# Patient Record
Sex: MALE | Race: WHITE | NOT HISPANIC OR LATINO | URBAN - METROPOLITAN AREA
[De-identification: names, ages, dates, MRNs, and addresses within clinical notes are randomized per-mention and may not be internally consistent; named-entity substitution may affect disease eponyms.]

---
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
## 2021-05-30 VITALS — WEIGHT: 126.5 LBS | BODY MASS INDEX: 18.74 KG/M2 | HEIGHT: 69 IN

## 2021-05-31 VITALS — WEIGHT: 140 LBS | HEIGHT: 70 IN | BODY MASS INDEX: 20.04 KG/M2

## 2021-06-01 VITALS — HEIGHT: 69 IN | WEIGHT: 134.25 LBS | BODY MASS INDEX: 19.88 KG/M2

## 2021-06-01 VITALS — HEIGHT: 69 IN | WEIGHT: 135.25 LBS | BODY MASS INDEX: 20.03 KG/M2

## 2021-06-01 VITALS — HEIGHT: 69 IN | WEIGHT: 135.56 LBS | BODY MASS INDEX: 20.08 KG/M2

## 2021-06-02 ENCOUNTER — RECORDS - HEALTHEAST (OUTPATIENT)
Dept: ADMINISTRATIVE | Facility: CLINIC | Age: 21
End: 2021-06-02

## 2021-06-02 VITALS — WEIGHT: 145.44 LBS | HEIGHT: 69 IN | BODY MASS INDEX: 21.54 KG/M2

## 2021-06-09 NOTE — PROGRESS NOTES
Name: Brandon Littlejohn  Age: 16 y.o.  Gender: male  : 2000  Date of Encounter: 3/21/2017    ASSESSMENT:  1. Streptococcal pharyngitis  - amoxicillin (AMOXIL) 500 MG tablet; Take 2 tablets (1,000 mg total) by mouth daily for 10 days.  Dispense: 20 tablet; Refill: 0      PLAN:  Complete antibiotics as ordered above.  Continue to use tylenol or ibuprofen as needed for pain and fever.  Your child is contagious for the next 24 hours and then can return to activities as tolerated.  If symptoms are not improving in the next 48 hours please call back.          CHIEF COMPLAINT:  Chief Complaint   Patient presents with     Fever     Sore Throat     started Friday     Headache       HPI:  Brandon Littlejohn is a 16 y.o.  male who presents to the clinic with mom with concerns for throat pain. Symptoms started 4 days ago. He has had a fever, Tmax 101, for the last two days. He has a headache that is worse with sitting and standing. He took ibuprofen most recently last night before bed to assist with sleep. He has pain with both eating and drinking. He has a mild, dry cough without nasal congestion. He denies nausea, stomach pain, and diarrhea.    Past Med / Surg History: He is under vaccinated. Allergic to benzonatate with laryngospasm. Otherwise healthy.     Fam / Soc History:  Social History     Social History     Marital status: Single     Spouse name: N/A     Number of children: N/A     Years of education: N/A     Social History Main Topics     Smoking status: Never Smoker     Smokeless tobacco: None     Alcohol use None     Drug use: None     Sexual activity: Not Asked     Other Topics Concern     None     Social History Narrative       ROS:  Gen: As reviewed above.  Eyes: No eye discharge.   ENT: As reviewed above.  Resp: As reviewed above.  GI: As reviewed above.  : Urinating well.  MS: No joint/bone/muscle tenderness.  Skin: No rashes.  Neuro: No headaches.      Objective:  Vitals:   Visit Vitals      "/62 (Patient Site: Right Arm, Patient Position: Sitting, Cuff Size: Adult Regular)     Pulse 105     Temp 100.3  F (37.9  C) (Oral)     Ht 5' 8.5\" (1.74 m)     Wt 126 lb 8 oz (57.4 kg)     SpO2 98%     BMI 18.95 kg/m2     Wt Readings from Last 3 Encounters:   03/21/17 126 lb 8 oz (57.4 kg) (38 %, Z= -0.31)*   12/16/16 127 lb 4 oz (57.7 kg) (44 %, Z= -0.16)*   06/17/16 122 lb 9.6 oz (55.6 kg) (44 %, Z= -0.14)*     * Growth percentiles are based on CDC 2-20 Years data.       Gen: Alert, well appearing.  Eyes: Conjunctivae clear bilaterally. PERRL. EOMI.   ENT: Left TM pearly gray with visible bony landmarks and light reflex. Right TM pearly gray with visible bony landmarks and light reflex. No nasal congestion. No presence of nasal drainage. Posterior oropharynx erythematous. Mucosa moist and intact.  Heart: Regular rate and rhythm; normal S1 and S2; no murmurs.  Lungs: Unlabored respirations. Clear breath sounds throughout with good air movement. No wheezes, crackles, or rhonchi.  Abdomen: Bowel sounds present. Abdomen is non-distended. Abdomen is soft and non-tender to palpation. No hepatosplenomegaly. No masses.   Skin: Normal without rash, lesions, or bruising.  Neuro: Appropriate for age.  Hematologic/Lymph/Immune:  Anterior cervical lymph nodes shotty.       Pertinent results / imaging:  Recent Results (from the past 24 hour(s))   Rapid Strep A Screen-Throat   Result Value Ref Range    Rapid Strep A Antigen Group A Strep detected (!) No Group A Strep detected       DATA REVIEWED:  Additional History from Old Records Summarized (2): None  Decision to Obtain Records (1): None  Radiology Tests Summarized or Ordered (1): None  Labs Reviewed or Ordered (1): Labs ordered.  Medicine Test Summarized or Ordered (1): None  Independent Review of EKG, X-RAY, or RAPID STREP (2 each): None    The visit lasted a total of 9 minutes face to face with the patient. Over 50% of the time was spent counseling and educating the " patient about fever and sore throat.    I, Jackie Gandhi, am scribing for and in the presence of, ABILIO Jiménez.    I, ABILIO Jiménez, personally performed the services described in this documentation, as scribed by Jackie Gandhi in my presence, and it is both accurate and complete.    ABILIO Jiménez  Certified Pediatric Nurse Practitioner  [facility]  [phone]    Total Data Points: 1

## 2021-06-12 NOTE — PROGRESS NOTES
"ASSESSMENT: Plantar fasciitis, bilateral arch pain    PLAN: I recommended custom orthotics.  He was given a written order for custom orthotics through [facility] in [place].  Return to clinic if pain persists.      SUBJECTIVE: New patient visit at the [facility] regarding bilateral foot pain around the heels and metatarsal heads.  No trauma.  He has not seen bruising or swelling.  Symptoms have been present for several months.  He has tried a few different shoes without much change.  Pain is worse with increased activities.  He denies post static pain.  No pain in the Achilles tendon.    PHYSICAL EXAM:  /70  Pulse 64  Resp 16  Ht 5' 10\" (1.778 m)  Wt 140 lb (63.5 kg)  BMI 20.09 kg/m2  General: Pleasant 17 y.o. male in no acute distress.  Vascular: DP pulses are palpable. PT pulses are palpable. Pedal hair is present. Feet are warm to the touch.  Cardiac: Pulse is regular.  Lymphatic: No edema at the ankles.  Neuro: Sensation in the feet is grossly intact to light touch.  Derm: No open lesions.  Musculoskeletal: Tight plantar fascia bilaterally.  Otherwise reasonable arch height in adequate motion in foot and ankle joints.    Past Medical History:   Diagnosis Date     Eczema         has no past surgical history on file.    Allergies   Allergen Reactions     Benzonatate Other (See Comments)     Laryngospasm         No current outpatient prescriptions on file.     No current facility-administered medications for this visit.        Family History:  family history includes ADELFO disease in his father and paternal grandfather; No Medical Problems in his brother, maternal aunt, maternal grandfather, maternal grandmother, maternal uncle, mother, paternal aunt, paternal grandmother, paternal uncle, and sister. There is no history of Cancer, Clotting disorder, Diabetes, Dislocations, Osteoporosis, Psoriasis, Rashes / Skin problems, Severe sprains, or Ulcerative colitis.    Social History:  Reviewed, and he " reports that he has never smoked. He does not have any smokeless tobacco history on file.    Review of Systems:  A 12 point comprehensive review of systems was negative except as noted.

## 2021-06-17 NOTE — PROGRESS NOTES
2 6 mm warts one on each foot, one on the left great toe and one on the right first met ahead which was a plantar wart, treated with cryotherapy without complication.

## 2021-06-18 NOTE — PROGRESS NOTES
"Chief Complaint   Patient presents with     Sore Throat     throat still does not feel good, coughing now and runny nose, canker sores       HPI: Patient presents with continued continued, mild sore throat and rhinorrhea.  This is in the context of taking Pen-Vee K without success.  No fatigue.  Interestingly, discussion with mother notes that he is had seasonal allergies in the past.  Old records reviewed from 1/26/2015 shows, in summary, that he had a long history of a viral illness and not feeling well in the past as well.  He was treated with Zithromax.    ROS: No fever.  No fatigue.  No vomiting or diarrhea.    SH:    reports that he has never smoked. He does not have any smokeless tobacco history on file.      FH: The Patient's family history includes ADELFO disease in his father and paternal grandfather; No Medical Problems in his brother, maternal aunt, maternal grandfather, maternal grandmother, maternal uncle, mother, paternal aunt, paternal grandmother, paternal uncle, and sister. There is no history of Cancer, Clotting disorder, Diabetes, Dislocations, Osteoporosis, Psoriasis, Rashes / Skin problems, Severe sprains, or Ulcerative colitis.     Meds:  Brandon has a current medication list which includes the following prescription(s): penicillin vk.    O:  /72  Pulse 99  Temp 98.5  F (36.9  C)  Resp 16  Ht 5' 9\" (1.753 m)  Wt 135 lb 9 oz (61.5 kg)  SpO2 98%  BMI 20.02 kg/m2  HEENT shows nares normal with no rhinorrhea  Pharynx erythematous with postnasal drainage but no tonsillar swelling  Neck supple with no lymphadenopathy    A/P:   1. Seasonal allergies  -Zyrtec 10 mg daily  -Benadryl as needed  -Continue Pen-Vee K  -Return if not improving                                          "

## 2021-06-18 NOTE — PROGRESS NOTES
"Chief Complaint   Patient presents with     Sore Throat       HPI: Very pleasant 18-year-old male presents today with sharp constant pain in his throat for 24 hours duration of mild intensity.  Of note: He has a past history of strep pharyngitis.    ROS: No fever vomiting or diarrhea    SH:    reports that he has never smoked. He does not have any smokeless tobacco history on file.      FH: The Patient's family history includes ADELFO disease in his father and paternal grandfather; No Medical Problems in his brother, maternal aunt, maternal grandfather, maternal grandmother, maternal uncle, mother, paternal aunt, paternal grandmother, paternal uncle, and sister. There is no history of Cancer, Clotting disorder, Diabetes, Dislocations, Osteoporosis, Psoriasis, Rashes / Skin problems, Severe sprains, or Ulcerative colitis.     Meds:  Brandon has a current medication list which includes the following prescription(s): penicillin vk.    O:  /60  Pulse 94  Temp 98.3  F (36.8  C)  Resp 16  Ht 5' 9\" (1.753 m)  Wt 135 lb 4 oz (61.3 kg)  SpO2 97%  BMI 19.97 kg/m2   Constitutional:    --Vitals as above  --No acute distress  Eyes-  --Sclera noninjected  --Lids and conjunctiva normal  ENT-  --TMs clear  --Sclera noninjected  --Pharynx erythematous with cobblestone appearance and postnasal drainage  Neck-  --Neck supple    Lymph-  --No cervical lymphadenopathy  Lungs-  --Clear to Auscultation  Heart-  --Regular rate and rhythm  Skin-  --Pink and dry          A/P:   1. Sore throat  - Rapid Strep A Screen-Throat  - Group A Strep, RNA Direct Detection, Throat    2. Pharyngitis  - penicillin VK (PEN VK) 500 MG tablet; Take 1 tablet (500 mg total) by mouth 4 (four) times a day for 10 days.  Dispense: 40 tablet; Refill: 0                                          "

## 2021-06-21 NOTE — PROGRESS NOTES
"Chief Complaint   Patient presents with     Plantar Warts     Pt c/o recurring plantar warts on both feet.        HPI: Patient presents today for multiple issues.  First is recurrent foot pain.  He has pain in both the right and left foot.  He also notes new plantar warts.  Was seen in the past for plantar warts treatment as well.  He has warts on both the right and left foot, with 4 warts on the right foot one on the left.  He is dancing in a play at school and needs to make sure that he is able to do so.    He and his mother also concerned about red \"dots\" in his left neck that is been there for the past several days.  They are nonpainful but he is concerned they may be worrisome.  Patient notes that he has anxiety as well.    ROS: No lumps in the neck.  No neck stiffness.  No fever.    SH:    reports that he has never smoked. He does not have any smokeless tobacco history on file.      FH: The Patient's family history includes ADELFO disease in his father and paternal grandfather; No Medical Problems in his brother, maternal aunt, maternal grandfather, maternal grandmother, maternal uncle, mother, paternal aunt, paternal grandmother, paternal uncle, and sister. There is no history of Cancer, Clotting disorder, Diabetes, Dislocations, Osteoporosis, Psoriasis, Rashes / Skin problems, Severe sprains, or Ulcerative colitis.     Meds:  Brandon currently has no medications in their medication list.    O:  /70  Pulse 103  Resp 16  Ht 5' 9\" (1.753 m)  Wt 145 lb 7 oz (66 kg)  SpO2 98%  BMI 21.48 kg/m2  Examination of both feet plantar surface show for plantar warts on the plantar surface of the right foot and one in the left foot.    Examination neck shows neck is supple thyroid not enlarged there is approximately 5 4-5 mm discrete erythematous papules.    A/P:   1. Pain in both feet  Most likely due to plantar warts.    2. Plantar warts  After consent was obtained 5 warts on both feet were treated with cryotherapy " without complication    3. Folliculitis  Most likely represents a folliculitis.  If getting worse would consider further investigation however no evidence of infection, lymphadenitis or worrisome signs noted.  Reassurance given.